# Patient Record
Sex: FEMALE | Race: BLACK OR AFRICAN AMERICAN | NOT HISPANIC OR LATINO | URBAN - METROPOLITAN AREA
[De-identification: names, ages, dates, MRNs, and addresses within clinical notes are randomized per-mention and may not be internally consistent; named-entity substitution may affect disease eponyms.]

---

## 2017-03-22 ENCOUNTER — EMERGENCY (EMERGENCY)
Facility: HOSPITAL | Age: 32
LOS: 1 days | Discharge: HOME | End: 2017-03-22
Attending: EMERGENCY MEDICINE | Admitting: EMERGENCY MEDICINE
Payer: COMMERCIAL

## 2017-03-22 VITALS
RESPIRATION RATE: 18 BRPM | OXYGEN SATURATION: 99 % | DIASTOLIC BLOOD PRESSURE: 89 MMHG | TEMPERATURE: 98 F | SYSTOLIC BLOOD PRESSURE: 166 MMHG | HEART RATE: 97 BPM

## 2017-03-22 VITALS
RESPIRATION RATE: 18 BRPM | DIASTOLIC BLOOD PRESSURE: 68 MMHG | HEART RATE: 90 BPM | SYSTOLIC BLOOD PRESSURE: 145 MMHG | TEMPERATURE: 99 F | OXYGEN SATURATION: 99 %

## 2017-03-22 DIAGNOSIS — O20.0 THREATENED ABORTION: ICD-10-CM

## 2017-03-22 DIAGNOSIS — Z3A.11 11 WEEKS GESTATION OF PREGNANCY: ICD-10-CM

## 2017-03-22 DIAGNOSIS — O20.9 HEMORRHAGE IN EARLY PREGNANCY, UNSPECIFIED: ICD-10-CM

## 2017-03-22 PROCEDURE — 84702 CHORIONIC GONADOTROPIN TEST: CPT

## 2017-03-22 PROCEDURE — 85025 COMPLETE CBC W/AUTO DIFF WBC: CPT

## 2017-03-22 PROCEDURE — 36415 COLL VENOUS BLD VENIPUNCTURE: CPT

## 2017-03-22 PROCEDURE — 83735 ASSAY OF MAGNESIUM: CPT

## 2017-03-22 PROCEDURE — 99283 EMERGENCY DEPT VISIT LOW MDM: CPT

## 2017-03-22 PROCEDURE — 99284 EMERGENCY DEPT VISIT MOD MDM: CPT | Mod: 25

## 2017-03-22 PROCEDURE — 86901 BLOOD TYPING SEROLOGIC RH(D): CPT

## 2017-03-22 PROCEDURE — 86900 BLOOD TYPING SEROLOGIC ABO: CPT

## 2017-03-22 PROCEDURE — 80053 COMPREHEN METABOLIC PANEL: CPT

## 2017-03-22 PROCEDURE — 86850 RBC ANTIBODY SCREEN: CPT

## 2017-03-22 PROCEDURE — 99053 MED SERV 10PM-8AM 24 HR FAC: CPT

## 2017-03-22 NOTE — CONSULT NOTE ADULT - PROBLEM SELECTOR RECOMMENDATION 9
- pt clinically stable  - cervix appears closed   - positive FH on TVUS  - encouraged patient to be compliant with iron supplementation TID due to anemia  - teri Gillis, stable for discharge w/ routine f/u, precautions given

## 2017-03-22 NOTE — CONSULT NOTE ADULT - SUBJECTIVE AND OBJECTIVE BOX
30 yo  at 11wks by LMP c/w first trimester sonogram presents c/o vaginal bleeding. She states she felt "wet" and went to the bathroom and noted bright red vaginal bleeding when she wiped w/o clots or tissue. She denies headache, dizziness, CP, palpitations, SOB, intercourse w/i last 24 hours, abnormal vaginal discharge, dysuria. She had one episode of nausea w/ vomiting this morning that she attributes to her usual morning sickness.   Antepartum course: uneventful thus far  OBHx: VTOP  GYNHx: fibroid uterus w/ menorrhagia, denies ovarian cysts, abnormal pap smears or STIs  MedHx: anemia, not compliant w/ iron supplementation due to constipation  SurgHx: denies  NKDA    Vital Signs Last 24 Hrs  T(C): 36.8, Max: 36.8 ( @ 01:35)  T(F): 98.2, Max: 98.2 ( @ 01:35)  HR: 97 (97 - 97)  BP: 166/89 (166/89 - 166/89)  BP(mean): --  RR: 18 (18 - 18)  SpO2: 99% (99% - 99%)  GA: NAD, A+Ox3  CV: RRR, no MRG  Pulm: CTAB  Abd: soft, NTND, no rebound or guarding, palpable fundal fibroids w/ uterus measuring up to level of umbilicus  Speculum: no vaginal bleeding noted, cervix appears closed, tilted to right  TVUS: + FH 167bpm                          9.4    8.3   )-----------( 290      ( 22 Mar 2017 02:32 )             26.6   22 Mar 2017 02:37    137    |  106    |  8      ----------------------------<  93     3.5     |  23     |  0.60     Ca    8.7        22 Mar 2017 02:37  Mg     1.9       22 Mar 2017 02:37    TPro  7.5    /  Alb  3.6    /  TBili  1.0    /  DBili  x      /  AST  4      /  ALT  10     /  AlkPhos  55     22 Mar 2017 02:37    RH POSITIVE 32 yo  at 11wks by LMP c/w first trimester sonogram presents c/o vaginal bleeding. She states she felt "wet" and went to the bathroom and noted bright red vaginal bleeding when she wiped w/o clots or tissue. She denies headache, dizziness, CP, palpitations, SOB, intercourse w/i last 24 hours, abnormal vaginal discharge, dysuria. She had one episode of nausea w/ vomiting this morning that she attributes to her usual morning sickness.   Antepartum course: uneventful thus far  OBHx: VTOP  GYNHx: fibroid uterus w/ menorrhagia, hx of ruptured ovarian cyst w/o surgical intervention, denies abnormal pap smears or STIs  MedHx: anemia, not compliant w/ iron supplementation due to constipation  SurgHx: denies  NKDA    Vital Signs Last 24 Hrs  T(C): 36.8, Max: 36.8 ( @ 01:35)  T(F): 98.2, Max: 98.2 ( @ 01:35)  HR: 97 (97 - 97)  BP: 166/89 (166/89 - 166/89)  BP(mean): --  RR: 18 (18 - 18)  SpO2: 99% (99% - 99%)  GA: NAD, A+Ox3  CV: RRR, no MRG  Pulm: CTAB  Abd: soft, NTND, no rebound or guarding, palpable fundal fibroids w/ uterus measuring up to level of umbilicus  Speculum: no vaginal bleeding noted, cervix appears closed, tilted to right  TVUS: + FH 167bpm                          9.4    8.3   )-----------( 290      ( 22 Mar 2017 02:32 )             26.6   22 Mar 2017 02:37    137    |  106    |  8      ----------------------------<  93     3.5     |  23     |  0.60     Ca    8.7        22 Mar 2017 02:37  Mg     1.9       22 Mar 2017 02:37    TPro  7.5    /  Alb  3.6    /  TBili  1.0    /  DBili  x      /  AST  4      /  ALT  10     /  AlkPhos  55     22 Mar 2017 02:37    RH POSITIVE

## 2017-03-22 NOTE — ED ADULT NURSE NOTE - OBJECTIVE STATEMENT
Patient is 11 weeks pregnant and began having vaginal bleeding approx an hour PTA , denies clotting.  Associated symptoms to include pelvic pressure, denies any pain.